# Patient Record
Sex: FEMALE | Race: OTHER | NOT HISPANIC OR LATINO | URBAN - METROPOLITAN AREA
[De-identification: names, ages, dates, MRNs, and addresses within clinical notes are randomized per-mention and may not be internally consistent; named-entity substitution may affect disease eponyms.]

---

## 2023-01-22 ENCOUNTER — EMERGENCY (EMERGENCY)
Facility: HOSPITAL | Age: 23
LOS: 1 days | Discharge: HOME | End: 2023-01-22
Attending: STUDENT IN AN ORGANIZED HEALTH CARE EDUCATION/TRAINING PROGRAM
Payer: COMMERCIAL

## 2023-01-22 VITALS
WEIGHT: 145.95 LBS | OXYGEN SATURATION: 99 % | RESPIRATION RATE: 18 BRPM | HEART RATE: 71 BPM | DIASTOLIC BLOOD PRESSURE: 72 MMHG | TEMPERATURE: 99 F | SYSTOLIC BLOOD PRESSURE: 107 MMHG

## 2023-01-22 PROCEDURE — 99284 EMERGENCY DEPT VISIT MOD MDM: CPT

## 2023-01-22 PROCEDURE — 99283 EMERGENCY DEPT VISIT LOW MDM: CPT

## 2023-01-22 RX ORDER — DIAZEPAM 5 MG
1 TABLET ORAL
Qty: 9 | Refills: 0
Start: 2023-01-22 | End: 2023-01-24

## 2023-01-22 RX ORDER — DIAZEPAM 5 MG
5 TABLET ORAL ONCE
Refills: 0 | Status: DISCONTINUED | OUTPATIENT
Start: 2023-01-22 | End: 2023-01-22

## 2023-01-22 RX ORDER — IBUPROFEN 200 MG
600 TABLET ORAL ONCE
Refills: 0 | Status: COMPLETED | OUTPATIENT
Start: 2023-01-22 | End: 2023-01-22

## 2023-01-22 RX ORDER — DEXAMETHASONE 0.5 MG/5ML
6 ELIXIR ORAL ONCE
Refills: 0 | Status: COMPLETED | OUTPATIENT
Start: 2023-01-22 | End: 2023-01-22

## 2023-01-22 RX ADMIN — Medication 5 MILLIGRAM(S): at 17:14

## 2023-01-22 RX ADMIN — Medication 6 MILLIGRAM(S): at 17:14

## 2023-01-22 RX ADMIN — Medication 600 MILLIGRAM(S): at 17:13

## 2023-01-22 NOTE — ED PROVIDER NOTE - CLINICAL SUMMARY MEDICAL DECISION MAKING FREE TEXT BOX
22-year-old female, presents for evaluation of bilateral lower back pain that is worsening over the last 3-4 days.  Patient is well-appearing, ambulatory, vital signs within normal limits, afebrile.  No midline spinal tenderness on exam.  Bilateral paraspinal tenderness to the L5-S2 area.  No focal neurodeficit on exam.  Low clinical suspicion for acute cord compression or severe large herniated disc.  Also low suspicion for SEA or fracture.  Will give pain medication and refer to orthospine for outpatient follow-up.  No indication for emergent MRI at this time.
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
## 2023-01-22 NOTE — ED PROVIDER NOTE - NSFOLLOWUPINSTRUCTIONS_ED_ALL_ED_FT
Follow up with the spine orthopedist within 1 week.  Take Naproxen 500 mg orally every 12 hours as needed. Take with food.  STOP the cyclobenzaprine and start taking the Diazepam as needed (see prescription).  If you experience any new or worsening symptoms or if you are concerned you can always come back to the emergency for a re-evaluation.  If there were any prescriptions given to you during the visit today take them as prescribed. If you have any questions you can ask the pharmacist.

## 2023-01-22 NOTE — ED PROVIDER NOTE - PATIENT PORTAL LINK FT
You can access the FollowMyHealth Patient Portal offered by [facility] by registering at the following website: [email or web address]. By joining [employer]’s FollowMyHealth portal, you will also be able to view your health information using other applications (apps) compatible with our system.
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
## 2023-01-22 NOTE — ED PROVIDER NOTE - PHYSICAL EXAMINATION
Bilateral paraspinal L5 S1-S2 spinal tenderness.  No midline spinal tenderness.  No erythema, induration or skin break.  Back is symmetrical, scapulae are symmetric. Neck has full range of motion. All limbs equally strong 5+ bilaterally. Strong ankle dorsiflexion and plantar flexion against resistance bilaterally. Strong flexion/extension of big toe bilaterally. Pt is able to walk in straight line with steady gait.

## 2023-02-14 ENCOUNTER — APPOINTMENT (OUTPATIENT)
Dept: ORTHOPEDIC SURGERY | Facility: CLINIC | Age: 23
End: 2023-02-14